# Patient Record
Sex: FEMALE | Race: WHITE | NOT HISPANIC OR LATINO | Employment: UNEMPLOYED | URBAN - METROPOLITAN AREA
[De-identification: names, ages, dates, MRNs, and addresses within clinical notes are randomized per-mention and may not be internally consistent; named-entity substitution may affect disease eponyms.]

---
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
## 2023-07-11 ENCOUNTER — OFFICE VISIT (OUTPATIENT)
Dept: URGENT CARE | Facility: URGENT CARE | Age: 23
End: 2023-07-11
Payer: COMMERCIAL

## 2023-07-11 VITALS
HEART RATE: 78 BPM | OXYGEN SATURATION: 99 % | DIASTOLIC BLOOD PRESSURE: 78 MMHG | TEMPERATURE: 98 F | SYSTOLIC BLOOD PRESSURE: 120 MMHG | RESPIRATION RATE: 20 BRPM

## 2023-07-11 DIAGNOSIS — J01.90 ACUTE SINUSITIS WITH SYMPTOMS > 10 DAYS: ICD-10-CM

## 2023-07-11 DIAGNOSIS — J98.8 WHEEZING-ASSOCIATED RESPIRATORY INFECTION (WARI): Primary | ICD-10-CM

## 2023-07-11 PROCEDURE — 99204 OFFICE O/P NEW MOD 45 MIN: CPT | Performed by: PHYSICIAN ASSISTANT

## 2023-07-11 RX ORDER — AZITHROMYCIN 250 MG/1
TABLET, FILM COATED ORAL
Qty: 6 TABLET | Refills: 0 | Status: SHIPPED | OUTPATIENT
Start: 2023-07-11 | End: 2023-07-16

## 2023-07-11 RX ORDER — PREDNISONE 20 MG/1
40 TABLET ORAL DAILY
Qty: 10 TABLET | Refills: 0 | Status: SHIPPED | OUTPATIENT
Start: 2023-07-11 | End: 2023-07-16

## 2023-07-11 RX ORDER — ALBUTEROL SULFATE 90 UG/1
2 AEROSOL, METERED RESPIRATORY (INHALATION) EVERY 6 HOURS PRN
Qty: 18 G | Refills: 0 | Status: SHIPPED | OUTPATIENT
Start: 2023-07-11 | End: 2023-08-10

## 2023-07-11 NOTE — PROGRESS NOTES
ASSESSMENT/PLAN:    (J98.8) Wheezing-associated respiratory infection (WARI)  (primary encounter diagnosis)    MDM: Prolonged cough and sinusitis symptoms.  Associated reactive airway component.  Favor secondary bacterial infection. Occult pneumonia is in differential. No severe respiratory distress requiring further ER or hospital inpatient management now.     Treatment plan as per below. I have advised low threshold for medical follow-up if symptoms fail to resolve with treatment provided here today, and immediately if symptoms worsen.     I have also advised chest x-ray if no improvement following several days of treatment provided here today, if any worsening or if respiratory symptoms fail to fully resolve in the next 10 days.     Urgent and emergent follow-up criteria are also reviewed. Please see below patient discharge summary.     Plan: azithromycin (ZITHROMAX) 250 MG tablet,         predniSONE (DELTASONE) 20 MG tablet, albuterol         (PROAIR HFA/PROVENTIL HFA/VENTOLIN HFA) 108 (90        Base) MCG/ACT inhaler          July 11, 2023  [facility]  Care Plan:       1. Start the Z-Pack antibiotic for your lung infection    2. Start the prednisone for your wheezing     3. Use the Albuterol inhaler ( 2 puffs every 4-6 hours as needed for cough and wheezing)     4. Follow-up with your primary care provider or urgent care if your symptoms fail to improve after 3 days of treatment provided here today sooner if any sudden, severe, worsening.     5. If you cough is not gone  in 10 days, see your primary care provider for chest x-ray and further evaluation.     6. You should follow-up immediately if you develop any of the below:     Fever of 100.4 F (38 C) or higher, or as directed by your healthcare provider  Coughing up increasing amounts of colored sputum  Weakness, drowsiness, headache, facial pain, ear pain, or a stiff neck    Go to the EMERGENCY ROOM if you develop the below:     Coughing up  blood  Worsening weakness, drowsiness, headache, or stiff neck  Increased wheezing not helped with medication, shortness of breath, or pain with breathing    (J01.90) Acute sinusitis with symptoms > 10 days    Plan: azithromycin (ZITHROMAX) 250 MG tablet,         predniSONE (DELTASONE) 20 MG tablet, albuterol         (PROAIR HFA/PROVENTIL HFA/VENTOLIN HFA) 108 (90        Base) MCG/ACT inhaler          This progress note has been dictated, with use of voice recognition software. Any grammatical, typographical, or context errors are unintentional and inherent to use of voice recognition software.    -------------------------              SUBJECTIVE:    Beth Kumar is a 22-year-old female, with a past medical history that includes reactive airway with URI, presenting to urgent care for evaluation of waxing and waning nasal//sinus congestion and waxing and waning cough with some wheezing x6 weeks duration.  Patient notes interval worsening of wheezing, prompting her urgent care visit today.    Of note: Patient states she has had negative home COVID testing    Illness exposure: No known specific close contact illness exposure       RESP HX: Prone to bronchitis and pneumonia. Positive for history of what sounds like reactive airway (has needed Albuterol with bronchitis and sometimes with exercise after or during URI). No prior history of hospitalization for respiratory distress. No formal diagnosis of asthma or COPD.                ROS: Positive as per above. No  fever or shaking chills. No associated severe cough,coughing up of blood, blue lips/fingers/toes, or severe shortness of breath (confirms they are still able to to all self cares and activities of daily living). No acute fainting, chest pain, racing or irregular heartbeats. No acute onset abdominal pain, nausea, vomiting, or diarrhea. No severe body aches, severe headaches, rashes, hives, joint swelling or other acute illness symptoms.     Past Medical History:    Diagnosis Date    NO ACTIVE PROBLEMS        There is no problem list on file for this patient.      Current Outpatient Medications   Medication    albuterol (PROAIR HFA/PROVENTIL HFA/VENTOLIN HFA) 108 (90 Base) MCG/ACT inhaler     No current facility-administered medications for this visit.       No Known Allergies      OBJECTIVE:  /78   Pulse 78   Temp 98  F (36.7  C) (Tympanic)   Resp 20   SpO2 99%       General appearance: alert and no apparent distress  Skin color is uniform in color and without rash.  HEENT:   Conjunctiva not injected.  Sclera clear.  Left TM is normal: no effusions, no erythema, and normal landmarks.  Right TM is normal: no effusions, no erythema, and normal landmarks.  Nasal mucosa is Congested  Oropharyngeal exam is normal other than evidence of postnasal drip: no lesions, erythema, adenopathy or exudate.  NECK: Trachea is midline. Neck is supple, FROM with no adenopathy  CARDIAC:NORMAL - regular rate and rhythm without murmur.  RESP: No increased work of breathing at rest--able to speak full sentences without pause. Positive for scattered rhonchi.  Positive for diffuse wheezing throughout.  Patient is still moving air into all listening areas today.   NEURO: Alert and oriented.  Normal speech and mentation.  CN II/XII grossly intact.  Gait within normal limits.      LAB/X-RAY: We discussed the option of obtaining a chest x-ray and blood count today to screen for pneumonia.  Patient declines the above today, but agrees to follow-up for chest x-ray and further evaluation if her symptoms fail to fully resolve with the below treatment

## 2023-07-11 NOTE — PATIENT INSTRUCTIONS
July 11, 2023  [facility]  Care Plan:       1. Start the Z-Pack antibiotic for your lung infection    2. Start the prednisone for your wheezing     3. Use the Albuterol inhaler ( 2 puffs every 4-6 hours as needed for cough and wheezing)     4. Follow-up with your primary care provider or urgent care if your symptoms fail to improve after 3 days of treatment provided here today sooner if any sudden, severe, worsening.     5. If you cough is not gone  in 10 days, see your primary care provider for chest x-ray and further evaluation.     6. You should follow-up immediately if you develop any of the below:     Fever of 100.4 F (38 C) or higher, or as directed by your healthcare provider  Coughing up increasing amounts of colored sputum  Weakness, drowsiness, headache, facial pain, ear pain, or a stiff neck    Go to the EMERGENCY ROOM if you develop the below:     Coughing up blood  Worsening weakness, drowsiness, headache, or stiff neck  Increased wheezing not helped with medication, shortness of breath, or pain with breathing